# Patient Record
Sex: MALE | Race: WHITE | Employment: FULL TIME | URBAN - METROPOLITAN AREA
[De-identification: names, ages, dates, MRNs, and addresses within clinical notes are randomized per-mention and may not be internally consistent; named-entity substitution may affect disease eponyms.]

---

## 2017-01-23 ENCOUNTER — TELEPHONE (OUTPATIENT)
Dept: FAMILY MEDICINE CLINIC | Age: 57
End: 2017-01-23

## 2017-01-24 ENCOUNTER — NURSE ONLY (OUTPATIENT)
Dept: FAMILY MEDICINE CLINIC | Age: 57
End: 2017-01-24

## 2017-01-24 DIAGNOSIS — Z00.00 WELL ADULT EXAM: Primary | ICD-10-CM

## 2017-01-24 LAB
A/G RATIO: 1.7 (ref 1.1–2.2)
ALBUMIN SERPL-MCNC: 4.3 G/DL (ref 3.4–5)
ALP BLD-CCNC: 52 U/L (ref 40–129)
ALT SERPL-CCNC: 26 U/L (ref 10–40)
ANION GAP SERPL CALCULATED.3IONS-SCNC: 14 MMOL/L (ref 3–16)
AST SERPL-CCNC: 22 U/L (ref 15–37)
BILIRUB SERPL-MCNC: 1 MG/DL (ref 0–1)
BILIRUBIN, POC: ABNORMAL
BLOOD URINE, POC: ABNORMAL
BUN BLDV-MCNC: 9 MG/DL (ref 7–20)
CALCIUM SERPL-MCNC: 9.2 MG/DL (ref 8.3–10.6)
CHLORIDE BLD-SCNC: 101 MMOL/L (ref 99–110)
CHOLESTEROL, TOTAL: 160 MG/DL (ref 0–199)
CLARITY, POC: ABNORMAL
CO2: 26 MMOL/L (ref 21–32)
COLOR, POC: ABNORMAL
CREAT SERPL-MCNC: 0.9 MG/DL (ref 0.9–1.3)
GFR AFRICAN AMERICAN: >60
GFR NON-AFRICAN AMERICAN: >60
GLOBULIN: 2.6 G/DL
GLUCOSE BLD-MCNC: 113 MG/DL (ref 70–99)
GLUCOSE URINE, POC: ABNORMAL
HCT VFR BLD CALC: 45.8 % (ref 40.5–52.5)
HDLC SERPL-MCNC: 63 MG/DL (ref 40–60)
HEMOGLOBIN: 15.5 G/DL (ref 13.5–17.5)
KETONES, POC: ABNORMAL
LDL CHOLESTEROL CALCULATED: 68 MG/DL
LEUKOCYTE EST, POC: ABNORMAL
MCH RBC QN AUTO: 32.9 PG (ref 26–34)
MCHC RBC AUTO-ENTMCNC: 33.8 G/DL (ref 31–36)
MCV RBC AUTO: 97.2 FL (ref 80–100)
NITRITE, POC: ABNORMAL
PDW BLD-RTO: 13.6 % (ref 12.4–15.4)
PH, POC: 6
PLATELET # BLD: 224 K/UL (ref 135–450)
PMV BLD AUTO: 8.4 FL (ref 5–10.5)
POTASSIUM SERPL-SCNC: 3.9 MMOL/L (ref 3.5–5.1)
PROSTATE SPECIFIC ANTIGEN: 1.54 NG/ML (ref 0–4)
PROTEIN, POC: ABNORMAL
RBC # BLD: 4.71 M/UL (ref 4.2–5.9)
SODIUM BLD-SCNC: 141 MMOL/L (ref 136–145)
SPECIFIC GRAVITY, POC: 1.02
TOTAL PROTEIN: 6.9 G/DL (ref 6.4–8.2)
TRIGL SERPL-MCNC: 147 MG/DL (ref 0–150)
TSH SERPL DL<=0.05 MIU/L-ACNC: 4.13 UIU/ML (ref 0.27–4.2)
UROBILINOGEN, POC: 0.2
VLDLC SERPL CALC-MCNC: 29 MG/DL
WBC # BLD: 7.3 K/UL (ref 4–11)

## 2017-01-24 PROCEDURE — 81002 URINALYSIS NONAUTO W/O SCOPE: CPT | Performed by: FAMILY MEDICINE

## 2017-01-24 PROCEDURE — 36415 COLL VENOUS BLD VENIPUNCTURE: CPT | Performed by: FAMILY MEDICINE

## 2017-01-31 ENCOUNTER — OFFICE VISIT (OUTPATIENT)
Dept: FAMILY MEDICINE CLINIC | Age: 57
End: 2017-01-31

## 2017-01-31 VITALS
HEIGHT: 77 IN | SYSTOLIC BLOOD PRESSURE: 154 MMHG | DIASTOLIC BLOOD PRESSURE: 110 MMHG | BODY MASS INDEX: 25.76 KG/M2 | WEIGHT: 218.2 LBS

## 2017-01-31 DIAGNOSIS — R31.29 HEMATURIA, MICROSCOPIC: ICD-10-CM

## 2017-01-31 DIAGNOSIS — F41.9 ANXIETY: ICD-10-CM

## 2017-01-31 DIAGNOSIS — Z23 NEED FOR PNEUMOCOCCAL VACCINATION: ICD-10-CM

## 2017-01-31 DIAGNOSIS — M54.41 CHRONIC MIDLINE LOW BACK PAIN WITH BILATERAL SCIATICA: ICD-10-CM

## 2017-01-31 DIAGNOSIS — Z00.00 HEALTH CARE MAINTENANCE: Primary | ICD-10-CM

## 2017-01-31 DIAGNOSIS — J43.1 PANLOBULAR EMPHYSEMA (HCC): ICD-10-CM

## 2017-01-31 DIAGNOSIS — M54.42 CHRONIC MIDLINE LOW BACK PAIN WITH BILATERAL SCIATICA: ICD-10-CM

## 2017-01-31 DIAGNOSIS — G89.29 CHRONIC MIDLINE LOW BACK PAIN WITH BILATERAL SCIATICA: ICD-10-CM

## 2017-01-31 DIAGNOSIS — I10 ESSENTIAL HYPERTENSION: ICD-10-CM

## 2017-01-31 PROCEDURE — 90471 IMMUNIZATION ADMIN: CPT | Performed by: FAMILY MEDICINE

## 2017-01-31 PROCEDURE — 99396 PREV VISIT EST AGE 40-64: CPT | Performed by: FAMILY MEDICINE

## 2017-01-31 PROCEDURE — 90732 PPSV23 VACC 2 YRS+ SUBQ/IM: CPT | Performed by: FAMILY MEDICINE

## 2017-01-31 RX ORDER — ALPRAZOLAM 0.5 MG/1
TABLET ORAL
Qty: 90 TABLET | Refills: 0 | Status: SHIPPED | OUTPATIENT
Start: 2017-01-31 | End: 2017-02-27

## 2017-01-31 RX ORDER — HYDROCODONE BITARTRATE AND ACETAMINOPHEN 5; 325 MG/1; MG/1
1 TABLET ORAL EVERY 6 HOURS PRN
Qty: 50 TABLET | Refills: 0 | Status: SHIPPED | OUTPATIENT
Start: 2017-01-31 | End: 2018-02-09

## 2017-01-31 RX ORDER — BUDESONIDE AND FORMOTEROL FUMARATE DIHYDRATE 160; 4.5 UG/1; UG/1
2 AEROSOL RESPIRATORY (INHALATION) 2 TIMES DAILY
Qty: 1 INHALER | Refills: 5 | Status: SHIPPED | OUTPATIENT
Start: 2017-01-31 | End: 2018-02-09

## 2017-01-31 RX ORDER — LOSARTAN POTASSIUM 100 MG/1
100 TABLET ORAL DAILY
Qty: 30 TABLET | Refills: 3 | Status: SHIPPED | OUTPATIENT
Start: 2017-01-31 | End: 2017-06-02 | Stop reason: SDUPTHER

## 2017-01-31 RX ORDER — AMLODIPINE BESYLATE AND ATORVASTATIN CALCIUM 5; 20 MG/1; MG/1
TABLET, FILM COATED ORAL
Qty: 30 TABLET | Refills: 3 | Status: SHIPPED | OUTPATIENT
Start: 2017-01-31 | End: 2017-07-30 | Stop reason: SDUPTHER

## 2017-01-31 ASSESSMENT — ENCOUNTER SYMPTOMS
CONSTIPATION: 0
ABDOMINAL PAIN: 0
WHEEZING: 0
SORE THROAT: 0
BLOOD IN STOOL: 0
TROUBLE SWALLOWING: 0
CHEST TIGHTNESS: 0
SHORTNESS OF BREATH: 1
EYE PAIN: 0
DIARRHEA: 0

## 2017-02-05 LAB
6-ACETYLMORPHINE: NOT DETECTED
7-AMINOCLONAZEPAM: NOT DETECTED
ALPHA-OH-ALPRAZOLAM: NOT DETECTED
ALPRAZOLAM: NOT DETECTED
AMPHETAMINE: NOT DETECTED
BARBITURATES: NOT DETECTED
BENZOYLECGONINE: NOT DETECTED
BUPRENORPHINE: NOT DETECTED
CARISOPRODOL: NOT DETECTED
CLONAZEPAM: NOT DETECTED
CODEINE: NOT DETECTED
CREATININE URINE: 65 MG/DL (ref 20–400)
DIAZEPAM: NOT DETECTED
DRUGS EXPECTED: NORMAL
EER PAIN MGT DRUG PANEL, HIGH RES/EMIT U: NORMAL
ETHYL GLUCURONIDE: PRESENT
FENTANYL: NOT DETECTED
HYDROCODONE: PRESENT
HYDROMORPHONE: NOT DETECTED
LORAZEPAM: NOT DETECTED
MARIJUANA METABOLITE: PRESENT
MDA: NOT DETECTED
MDEA: NOT DETECTED
MDMA URINE: NOT DETECTED
MEPERIDINE: NOT DETECTED
METHADONE: NOT DETECTED
METHAMPHETAMINE: NOT DETECTED
METHYLPHENIDATE: NOT DETECTED
MIDAZOLAM: NOT DETECTED
MORPHINE: NOT DETECTED
NORBUPRENORPHINE, FREE: NOT DETECTED
NORDIAZEPAM: NOT DETECTED
NORFENTANYL: NOT DETECTED
NORHYDROCODONE, URINE: PRESENT
NOROXYCODONE: NOT DETECTED
NOROXYMORPHONE, URINE: NOT DETECTED
OXAZEPAM: NOT DETECTED
OXYCODONE: NOT DETECTED
OXYMORPHONE: NOT DETECTED
PAIN MANAGEMENT DRUG PANEL: NORMAL
PAIN MANAGEMENT DRUG PANEL: NORMAL
PCP: NOT DETECTED
PHENTERMINE: NOT DETECTED
PROPOXYPHENE: NOT DETECTED
TAPENTADOL, URINE: NOT DETECTED
TAPENTADOL-O-SULFATE, URINE: NOT DETECTED
TEMAZEPAM: NOT DETECTED
TRAMADOL: NOT DETECTED
ZOLPIDEM: NOT DETECTED

## 2017-02-27 ENCOUNTER — OFFICE VISIT (OUTPATIENT)
Dept: FAMILY MEDICINE CLINIC | Age: 57
End: 2017-02-27

## 2017-02-27 VITALS
HEIGHT: 77 IN | BODY MASS INDEX: 25.93 KG/M2 | SYSTOLIC BLOOD PRESSURE: 142 MMHG | WEIGHT: 219.6 LBS | DIASTOLIC BLOOD PRESSURE: 100 MMHG

## 2017-02-27 DIAGNOSIS — I10 ESSENTIAL HYPERTENSION: Primary | ICD-10-CM

## 2017-02-27 DIAGNOSIS — F41.9 ANXIETY: ICD-10-CM

## 2017-02-27 DIAGNOSIS — J43.1 PANLOBULAR EMPHYSEMA (HCC): ICD-10-CM

## 2017-02-27 PROCEDURE — 99213 OFFICE O/P EST LOW 20 MIN: CPT | Performed by: FAMILY MEDICINE

## 2017-02-27 RX ORDER — DIAZEPAM 2 MG/1
2 TABLET ORAL EVERY 12 HOURS PRN
Qty: 60 TABLET | Refills: 2 | Status: SHIPPED | OUTPATIENT
Start: 2017-02-27 | End: 2017-11-02 | Stop reason: SDUPTHER

## 2017-02-27 RX ORDER — METOPROLOL TARTRATE 50 MG/1
50 TABLET, FILM COATED ORAL 2 TIMES DAILY
Qty: 60 TABLET | Refills: 3 | Status: SHIPPED | OUTPATIENT
Start: 2017-02-27 | End: 2017-03-21 | Stop reason: SDUPTHER

## 2017-02-27 ASSESSMENT — ENCOUNTER SYMPTOMS
SHORTNESS OF BREATH: 0
BLOOD IN STOOL: 0
WHEEZING: 0
CONSTIPATION: 0
TROUBLE SWALLOWING: 0
CHEST TIGHTNESS: 0
ABDOMINAL PAIN: 0
DIARRHEA: 0
SORE THROAT: 0

## 2017-03-21 ENCOUNTER — OFFICE VISIT (OUTPATIENT)
Dept: FAMILY MEDICINE CLINIC | Age: 57
End: 2017-03-21

## 2017-03-21 VITALS
BODY MASS INDEX: 25.86 KG/M2 | DIASTOLIC BLOOD PRESSURE: 80 MMHG | SYSTOLIC BLOOD PRESSURE: 130 MMHG | WEIGHT: 219 LBS | HEIGHT: 77 IN

## 2017-03-21 DIAGNOSIS — I10 ESSENTIAL HYPERTENSION: Primary | ICD-10-CM

## 2017-03-21 PROCEDURE — 99213 OFFICE O/P EST LOW 20 MIN: CPT | Performed by: FAMILY MEDICINE

## 2017-03-21 RX ORDER — METOPROLOL TARTRATE 50 MG/1
50 TABLET, FILM COATED ORAL DAILY
Qty: 60 TABLET | Refills: 3
Start: 2017-03-21 | End: 2018-02-09

## 2017-03-21 ASSESSMENT — ENCOUNTER SYMPTOMS
DIARRHEA: 0
BLOOD IN STOOL: 0
SORE THROAT: 0
CONSTIPATION: 0
CHEST TIGHTNESS: 0
SHORTNESS OF BREATH: 0
WHEEZING: 0
ABDOMINAL PAIN: 0
TROUBLE SWALLOWING: 0

## 2017-03-21 ASSESSMENT — PATIENT HEALTH QUESTIONNAIRE - PHQ9
1. LITTLE INTEREST OR PLEASURE IN DOING THINGS: 1
SUM OF ALL RESPONSES TO PHQ QUESTIONS 1-9: 1
SUM OF ALL RESPONSES TO PHQ9 QUESTIONS 1 & 2: 1
2. FEELING DOWN, DEPRESSED OR HOPELESS: 0

## 2017-06-02 DIAGNOSIS — I10 ESSENTIAL HYPERTENSION: ICD-10-CM

## 2017-06-03 RX ORDER — LOSARTAN POTASSIUM 100 MG/1
TABLET ORAL
Qty: 30 TABLET | Refills: 0 | Status: SHIPPED | OUTPATIENT
Start: 2017-06-03 | End: 2017-07-03 | Stop reason: SDUPTHER

## 2017-07-31 RX ORDER — AMLODIPINE BESYLATE AND ATORVASTATIN CALCIUM 5; 20 MG/1; MG/1
TABLET, FILM COATED ORAL
Qty: 30 TABLET | Refills: 0 | Status: SHIPPED | OUTPATIENT
Start: 2017-07-31 | End: 2017-08-27 | Stop reason: SDUPTHER

## 2017-09-05 DIAGNOSIS — I10 ESSENTIAL HYPERTENSION: ICD-10-CM

## 2017-09-05 RX ORDER — LOSARTAN POTASSIUM 100 MG/1
TABLET ORAL
Qty: 30 TABLET | Refills: 0 | Status: SHIPPED | OUTPATIENT
Start: 2017-09-05 | End: 2017-10-02 | Stop reason: SDUPTHER

## 2017-10-02 DIAGNOSIS — I10 ESSENTIAL HYPERTENSION: ICD-10-CM

## 2017-10-02 RX ORDER — LOSARTAN POTASSIUM 100 MG/1
TABLET ORAL
Qty: 30 TABLET | Refills: 0 | Status: SHIPPED | OUTPATIENT
Start: 2017-10-02 | End: 2017-10-30 | Stop reason: SDUPTHER

## 2017-10-11 NOTE — PROGRESS NOTES
Subjective:      Patient ID: Bryan Ruano is a 61 y.o. male. HPI     Right Side Pain:  Patient began 1 week ago with pain in the right anterior lower ribs that radiates to the right lower posterior ribs. He has had similar symptoms a couple years ago and had normal GBUS and HIDA scan. It spontaneously resolved until 1 week ago when he helped his daughter move. He does not recall a specific injury. It is painful to take a deep breath. He does have cough and dyspnea. It is worse with movements but is not sore to the touch. Early Satiety:  Patient complains of a 6 month history of early satiety and feeling bloated. He denies any abdominal pain. Palpitations / Dizziness / Chest Pain:  Patient complains of a 1 year history of intermittent episodes of feeling lightheaded with presyncope with palpitations when he exerts himself. He will occasionally have left sided chest pain. He admits to being under a lot of stress. He had a negative stress test done 2/2016. Review of Systems   Constitutional: Negative for appetite change, chills and fever. Respiratory: Positive for cough and shortness of breath. Cardiovascular: Positive for chest pain and palpitations. Negative for leg swelling. Gastrointestinal: Positive for abdominal distention, diarrhea and nausea. Negative for abdominal pain, blood in stool, constipation and vomiting. BP Readings from Last 3 Encounters:   10/12/17 (!) 164/100   03/21/17 130/80   02/27/17 (!) 142/100       BP (!) 144/92   Ht 6' 5\" (1.956 m)   Wt 226 lb (102.5 kg)   BMI 26.80 kg/m²    BP (!) 164/100 (Site: Right Arm)   Ht 6' 5\" (1.956 m)   Wt 226 lb (102.5 kg)   BMI 26.80 kg/m²    Objective:   Physical Exam   Constitutional: He is oriented to person, place, and time. He appears well-developed and well-nourished. No distress. HENT:   Head: Normocephalic.    Right Ear: External ear normal.   Left Ear: External ear normal.   Mouth/Throat: Oropharynx is

## 2017-10-12 ENCOUNTER — HOSPITAL ENCOUNTER (OUTPATIENT)
Dept: CT IMAGING | Age: 57
Discharge: HOME | End: 2017-10-12
Attending: FAMILY MEDICINE | Admitting: FAMILY MEDICINE

## 2017-10-12 ENCOUNTER — OFFICE VISIT (OUTPATIENT)
Dept: FAMILY MEDICINE CLINIC | Age: 57
End: 2017-10-12

## 2017-10-12 VITALS
HEIGHT: 77 IN | DIASTOLIC BLOOD PRESSURE: 92 MMHG | WEIGHT: 226 LBS | SYSTOLIC BLOOD PRESSURE: 144 MMHG | BODY MASS INDEX: 26.68 KG/M2

## 2017-10-12 DIAGNOSIS — R00.2 PALPITATIONS: ICD-10-CM

## 2017-10-12 DIAGNOSIS — R07.81 RIB PAIN ON RIGHT SIDE: Primary | ICD-10-CM

## 2017-10-12 DIAGNOSIS — R79.89 OTHER SPECIFIED ABNORMAL FINDINGS OF BLOOD CHEMISTRY: ICD-10-CM

## 2017-10-12 DIAGNOSIS — R79.89 ELEVATED D-DIMER: ICD-10-CM

## 2017-10-12 DIAGNOSIS — R42 POSTURAL DIZZINESS WITH PRESYNCOPE: ICD-10-CM

## 2017-10-12 DIAGNOSIS — R06.02 SHORTNESS OF BREATH: ICD-10-CM

## 2017-10-12 DIAGNOSIS — R91.8 MASS OF UPPER LOBE OF LEFT LUNG: Primary | ICD-10-CM

## 2017-10-12 DIAGNOSIS — R68.81 EARLY SATIETY: ICD-10-CM

## 2017-10-12 DIAGNOSIS — R07.9 RIGHT-SIDED CHEST PAIN: Primary | ICD-10-CM

## 2017-10-12 DIAGNOSIS — R79.89 ELEVATED D-DIMER: Primary | ICD-10-CM

## 2017-10-12 DIAGNOSIS — Z23 NEED FOR INFLUENZA VACCINATION: ICD-10-CM

## 2017-10-12 DIAGNOSIS — R07.2 PRECORDIAL PAIN: ICD-10-CM

## 2017-10-12 DIAGNOSIS — R55 POSTURAL DIZZINESS WITH PRESYNCOPE: ICD-10-CM

## 2017-10-12 LAB
D DIMER: 645 NG/ML DDU (ref 0–229)
HCT VFR BLD CALC: 44.6 % (ref 40.5–52.5)
HEMOGLOBIN: 15.4 G/DL (ref 13.5–17.5)
MCH RBC QN AUTO: 34.4 PG (ref 26–34)
MCHC RBC AUTO-ENTMCNC: 34.6 G/DL (ref 31–36)
MCV RBC AUTO: 99.4 FL (ref 80–100)
PDW BLD-RTO: 13.5 % (ref 12.4–15.4)
PLATELET # BLD: 273 K/UL (ref 135–450)
PMV BLD AUTO: 7.9 FL (ref 5–10.5)
RBC # BLD: 4.49 M/UL (ref 4.2–5.9)
TSH SERPL DL<=0.05 MIU/L-ACNC: 4.04 UIU/ML (ref 0.27–4.2)
WBC # BLD: 8 K/UL (ref 4–11)

## 2017-10-12 PROCEDURE — 36415 COLL VENOUS BLD VENIPUNCTURE: CPT | Performed by: FAMILY MEDICINE

## 2017-10-12 PROCEDURE — 90471 IMMUNIZATION ADMIN: CPT | Performed by: FAMILY MEDICINE

## 2017-10-12 PROCEDURE — 90686 IIV4 VACC NO PRSV 0.5 ML IM: CPT | Performed by: FAMILY MEDICINE

## 2017-10-12 PROCEDURE — 99214 OFFICE O/P EST MOD 30 MIN: CPT | Performed by: FAMILY MEDICINE

## 2017-10-12 RX ORDER — OMEPRAZOLE 20 MG/1
20 CAPSULE, DELAYED RELEASE ORAL 2 TIMES DAILY
Qty: 30 CAPSULE | Refills: 3 | Status: SHIPPED | OUTPATIENT
Start: 2017-10-12 | End: 2018-02-09

## 2017-10-12 ASSESSMENT — ENCOUNTER SYMPTOMS
SHORTNESS OF BREATH: 1
VOMITING: 0
COUGH: 1
ABDOMINAL PAIN: 0
CONSTIPATION: 0
BLOOD IN STOOL: 0
NAUSEA: 1
DIARRHEA: 1
ABDOMINAL DISTENTION: 1

## 2017-10-13 ENCOUNTER — TELEPHONE (OUTPATIENT)
Dept: PULMONOLOGY | Age: 57
End: 2017-10-13

## 2017-10-13 ENCOUNTER — TELEPHONE (OUTPATIENT)
Dept: FAMILY MEDICINE CLINIC | Age: 57
End: 2017-10-13

## 2017-10-13 DIAGNOSIS — J43.1 PANLOBULAR EMPHYSEMA (HCC): Primary | ICD-10-CM

## 2017-10-13 DIAGNOSIS — R91.8 MASS OF LEFT LUNG: ICD-10-CM

## 2017-10-13 NOTE — TELEPHONE ENCOUNTER
Pt can not get in with Dr Fatou Mauro for a month. HIs daughter called to ask Dr Dayanna Puckett to send a new referral to Dr Jeannie Alvarez who can get him in asap [phone](R) [phone](X).  Please give pt a call when complete [phone]

## 2017-10-13 NOTE — TELEPHONE ENCOUNTER
Patient calling, stating he was referred by Dr. Erica Pinto for a lung nodule/Abnl CT Chest.  Please refiew and advise on urgency.

## 2017-10-30 DIAGNOSIS — I10 ESSENTIAL HYPERTENSION: ICD-10-CM

## 2017-10-30 RX ORDER — AMLODIPINE BESYLATE AND ATORVASTATIN CALCIUM 5; 20 MG/1; MG/1
TABLET, FILM COATED ORAL
Qty: 30 TABLET | Refills: 0 | Status: SHIPPED | OUTPATIENT
Start: 2017-10-30 | End: 2017-11-29 | Stop reason: SDUPTHER

## 2017-10-30 RX ORDER — LOSARTAN POTASSIUM 100 MG/1
TABLET ORAL
Qty: 30 TABLET | Refills: 0 | Status: SHIPPED | OUTPATIENT
Start: 2017-10-30 | End: 2017-11-29 | Stop reason: SDUPTHER

## 2017-11-02 DIAGNOSIS — F41.9 ANXIETY: ICD-10-CM

## 2017-11-02 RX ORDER — DIAZEPAM 2 MG/1
2 TABLET ORAL EVERY 12 HOURS PRN
Qty: 60 TABLET | Refills: 0 | Status: SHIPPED | OUTPATIENT
Start: 2017-11-02 | End: 2018-06-04

## 2018-01-02 RX ORDER — AMLODIPINE BESYLATE AND ATORVASTATIN CALCIUM 5; 20 MG/1; MG/1
TABLET, FILM COATED ORAL
Qty: 30 TABLET | Refills: 0 | Status: SHIPPED | OUTPATIENT
Start: 2018-01-02 | End: 2018-02-06 | Stop reason: SDUPTHER

## 2018-02-06 RX ORDER — AMLODIPINE BESYLATE AND ATORVASTATIN CALCIUM 5; 20 MG/1; MG/1
TABLET, FILM COATED ORAL
Qty: 30 TABLET | Refills: 0 | Status: SHIPPED | OUTPATIENT
Start: 2018-02-06 | End: 2018-03-07 | Stop reason: SDUPTHER

## 2018-02-09 ENCOUNTER — OFFICE VISIT (OUTPATIENT)
Dept: FAMILY MEDICINE CLINIC | Age: 58
End: 2018-02-09

## 2018-02-09 VITALS
WEIGHT: 209 LBS | SYSTOLIC BLOOD PRESSURE: 110 MMHG | BODY MASS INDEX: 24.68 KG/M2 | HEIGHT: 77 IN | DIASTOLIC BLOOD PRESSURE: 80 MMHG

## 2018-02-09 DIAGNOSIS — J43.1 PANLOBULAR EMPHYSEMA (HCC): ICD-10-CM

## 2018-02-09 DIAGNOSIS — Z29.9 PREVENTIVE MEASURE: ICD-10-CM

## 2018-02-09 DIAGNOSIS — K40.90 NON-RECURRENT UNILATERAL INGUINAL HERNIA WITHOUT OBSTRUCTION OR GANGRENE: ICD-10-CM

## 2018-02-09 DIAGNOSIS — E78.2 MIXED HYPERLIPIDEMIA: ICD-10-CM

## 2018-02-09 DIAGNOSIS — I10 ESSENTIAL HYPERTENSION: ICD-10-CM

## 2018-02-09 DIAGNOSIS — C34.92 CARCINOMA OF LEFT LUNG (HCC): ICD-10-CM

## 2018-02-09 DIAGNOSIS — Z00.00 HEALTHCARE MAINTENANCE: Primary | ICD-10-CM

## 2018-02-09 LAB
A/G RATIO: 1.7 (ref 1.1–2.2)
ALBUMIN SERPL-MCNC: 4.4 G/DL (ref 3.4–5)
ALP BLD-CCNC: 58 U/L (ref 40–129)
ALT SERPL-CCNC: 45 U/L (ref 10–40)
ANION GAP SERPL CALCULATED.3IONS-SCNC: 15 MMOL/L (ref 3–16)
AST SERPL-CCNC: 38 U/L (ref 15–37)
BILIRUB SERPL-MCNC: 0.6 MG/DL (ref 0–1)
BUN BLDV-MCNC: 8 MG/DL (ref 7–20)
CALCIUM SERPL-MCNC: 9.6 MG/DL (ref 8.3–10.6)
CHLORIDE BLD-SCNC: 102 MMOL/L (ref 99–110)
CHOLESTEROL, TOTAL: 156 MG/DL (ref 0–199)
CO2: 25 MMOL/L (ref 21–32)
CREAT SERPL-MCNC: 0.9 MG/DL (ref 0.9–1.3)
GFR AFRICAN AMERICAN: >60
GFR NON-AFRICAN AMERICAN: >60
GLOBULIN: 2.6 G/DL
GLUCOSE BLD-MCNC: 105 MG/DL (ref 70–99)
HCT VFR BLD CALC: 39.4 % (ref 40.5–52.5)
HDLC SERPL-MCNC: 44 MG/DL (ref 40–60)
HEMOGLOBIN: 13.5 G/DL (ref 13.5–17.5)
HEPATITIS C ANTIBODY INTERPRETATION: NORMAL
LDL CHOLESTEROL CALCULATED: 83 MG/DL
MCH RBC QN AUTO: 33.6 PG (ref 26–34)
MCHC RBC AUTO-ENTMCNC: 34.2 G/DL (ref 31–36)
MCV RBC AUTO: 98.4 FL (ref 80–100)
PDW BLD-RTO: 13 % (ref 12.4–15.4)
PLATELET # BLD: 248 K/UL (ref 135–450)
PMV BLD AUTO: 8.8 FL (ref 5–10.5)
POTASSIUM SERPL-SCNC: 4.6 MMOL/L (ref 3.5–5.1)
PROSTATE SPECIFIC ANTIGEN: 1.91 NG/ML (ref 0–4)
RBC # BLD: 4 M/UL (ref 4.2–5.9)
SODIUM BLD-SCNC: 142 MMOL/L (ref 136–145)
TOTAL PROTEIN: 7 G/DL (ref 6.4–8.2)
TRIGL SERPL-MCNC: 143 MG/DL (ref 0–150)
VLDLC SERPL CALC-MCNC: 29 MG/DL
WBC # BLD: 6.2 K/UL (ref 4–11)

## 2018-02-09 PROCEDURE — 99396 PREV VISIT EST AGE 40-64: CPT | Performed by: FAMILY MEDICINE

## 2018-02-09 PROCEDURE — 36415 COLL VENOUS BLD VENIPUNCTURE: CPT | Performed by: FAMILY MEDICINE

## 2018-02-09 ASSESSMENT — ENCOUNTER SYMPTOMS
WHEEZING: 0
CHEST TIGHTNESS: 0
BLOOD IN STOOL: 0
TROUBLE SWALLOWING: 0
CONSTIPATION: 0
SORE THROAT: 0
ABDOMINAL PAIN: 0
SHORTNESS OF BREATH: 1
DIARRHEA: 0

## 2018-02-09 ASSESSMENT — PATIENT HEALTH QUESTIONNAIRE - PHQ9
1. LITTLE INTEREST OR PLEASURE IN DOING THINGS: 0
SUM OF ALL RESPONSES TO PHQ9 QUESTIONS 1 & 2: 1
SUM OF ALL RESPONSES TO PHQ QUESTIONS 1-9: 1
2. FEELING DOWN, DEPRESSED OR HOPELESS: 1

## 2018-02-09 NOTE — PROGRESS NOTES
Mouth/Throat: Oropharynx is clear and moist.   Eyes: Conjunctivae are normal. No scleral icterus. Neck: Neck supple. No thyromegaly present. Cardiovascular: Normal rate, regular rhythm, normal heart sounds and intact distal pulses. No murmur heard. No ankle edema   Pulmonary/Chest: Effort normal and breath sounds normal. No respiratory distress. Abdominal: Soft. Bowel sounds are normal. There is no tenderness. Musculoskeletal: He exhibits no edema. Lymphadenopathy:     He has no cervical adenopathy. Neurological: He is alert and oriented to person, place, and time. Skin: Skin is warm. No rash noted. Chest wall scarring left sided   Psychiatric: He has a normal mood and affect.  Thought content normal.       Assessment:      Assessment/Plan:  Poly Moreau was seen today for annual exam.    Diagnoses and all orders for this visit:    Healthcare maintenance  -     Comprehensive Metabolic Panel  -     CBC    Essential hypertension  -     Comprehensive Metabolic Panel  -     CBC    Mixed hyperlipidemia  -     Comprehensive Metabolic Panel  -     CBC  -     Lipid Panel    Panlobular emphysema (HCC)  -     Comprehensive Metabolic Panel  -     CBC    Carcinoma of left lung (HCC)  -     Comprehensive Metabolic Panel  -     CBC    Non-recurrent unilateral inguinal hernia without obstruction or gangrene  -     Ritu Weir MD    Preventive measure  -     Psa screening  -     Hepatitis C Antibody            Plan:      Cont meds   ref dr Ellen Calle for hernia    Likely put off surg for 6 month or more    Never smoke   control etoh

## 2018-02-26 ENCOUNTER — INITIAL CONSULT (OUTPATIENT)
Dept: SURGERY | Age: 58
End: 2018-02-26

## 2018-02-26 VITALS
DIASTOLIC BLOOD PRESSURE: 80 MMHG | SYSTOLIC BLOOD PRESSURE: 118 MMHG | HEIGHT: 77 IN | BODY MASS INDEX: 24.68 KG/M2 | WEIGHT: 209 LBS

## 2018-02-26 DIAGNOSIS — K40.90 UNILATERAL INGUINAL HERNIA WITHOUT OBSTRUCTION OR GANGRENE, RECURRENCE NOT SPECIFIED: Primary | ICD-10-CM

## 2018-02-26 PROCEDURE — 99999 PR OFFICE/OUTPT VISIT,PROCEDURE ONLY: CPT | Performed by: SURGERY

## 2018-02-26 NOTE — PROGRESS NOTES
Subjective:      Patient ID: Александр Jiménez is a 61 y.o. male. HPI  Patient known from prior evaluation of inguinal hernia. Deferred repair at that time due to his wife's treatment of esophageal cancer. Unfortunately she has progressed and is due to stop treatment soon. He also underwent lobectomy for early lung cancer and has now recovered. His known left inguinal hernia has progressed and he would like to move ahead with repair but needs help his wife for now. Will await his call and plan accordingly. Review of Systems    Objective:   Physical Exam    Assessment:       1.  Unilateral inguinal hernia without obstruction or gangrene, recurrence not specified             Plan:      Repair at his convenience once other family matters are resolved

## 2018-03-07 DIAGNOSIS — I10 ESSENTIAL HYPERTENSION: ICD-10-CM

## 2018-03-07 RX ORDER — LOSARTAN POTASSIUM 100 MG/1
TABLET ORAL
Qty: 30 TABLET | Refills: 0 | Status: SHIPPED | OUTPATIENT
Start: 2018-03-07 | End: 2018-04-07 | Stop reason: SDUPTHER

## 2018-03-07 RX ORDER — AMLODIPINE BESYLATE AND ATORVASTATIN CALCIUM 5; 20 MG/1; MG/1
TABLET, FILM COATED ORAL
Qty: 30 TABLET | Refills: 0 | Status: SHIPPED | OUTPATIENT
Start: 2018-03-07 | End: 2018-04-07 | Stop reason: SDUPTHER

## 2018-05-17 ENCOUNTER — PAT TELEPHONE (OUTPATIENT)
Dept: PREADMISSION TESTING | Age: 58
End: 2018-05-17

## 2018-05-17 VITALS — WEIGHT: 220 LBS | HEIGHT: 77 IN | BODY MASS INDEX: 25.98 KG/M2

## 2018-05-17 RX ORDER — ALBUTEROL SULFATE 90 UG/1
AEROSOL, METERED RESPIRATORY (INHALATION)
COMMUNITY
Start: 2017-11-16 | End: 2018-06-04

## 2018-05-17 RX ORDER — GABAPENTIN 300 MG/1
300 CAPSULE ORAL DAILY
Refills: 0 | COMMUNITY
Start: 2018-03-29 | End: 2018-06-04

## 2018-05-18 ENCOUNTER — HOSPITAL ENCOUNTER (OUTPATIENT)
Dept: SURGERY | Age: 58
Discharge: HOME | End: 2018-05-18
Attending: SURGERY | Admitting: SURGERY

## 2018-05-18 VITALS
RESPIRATION RATE: 18 BRPM | BODY MASS INDEX: 25.08 KG/M2 | SYSTOLIC BLOOD PRESSURE: 137 MMHG | DIASTOLIC BLOOD PRESSURE: 83 MMHG | HEART RATE: 59 BPM | WEIGHT: 211.53 LBS | OXYGEN SATURATION: 96 % | TEMPERATURE: 97.1 F

## 2018-05-18 DIAGNOSIS — K40.90 LEFT INGUINAL HERNIA: Primary | ICD-10-CM

## 2018-05-18 PROCEDURE — 49505 PRP I/HERN INIT REDUC >5 YR: CPT | Performed by: SURGERY

## 2018-05-18 RX ORDER — OXYCODONE HYDROCHLORIDE AND ACETAMINOPHEN 5; 325 MG/1; MG/1
2 TABLET ORAL PRN
Status: COMPLETED | OUTPATIENT
Start: 2018-05-18 | End: 2018-05-18

## 2018-05-18 RX ORDER — MEPERIDINE HYDROCHLORIDE 25 MG/ML
12.5 INJECTION INTRAMUSCULAR; INTRAVENOUS; SUBCUTANEOUS EVERY 5 MIN PRN
Status: DISCONTINUED | OUTPATIENT
Start: 2018-05-18 | End: 2018-05-19 | Stop reason: HOSPADM

## 2018-05-18 RX ORDER — MORPHINE SULFATE 2 MG/ML
1 INJECTION, SOLUTION INTRAMUSCULAR; INTRAVENOUS EVERY 5 MIN PRN
Status: DISCONTINUED | OUTPATIENT
Start: 2018-05-18 | End: 2018-05-19 | Stop reason: HOSPADM

## 2018-05-18 RX ORDER — SODIUM CHLORIDE 0.9 % (FLUSH) 0.9 %
10 SYRINGE (ML) INJECTION PRN
Status: DISCONTINUED | OUTPATIENT
Start: 2018-05-18 | End: 2018-05-19 | Stop reason: HOSPADM

## 2018-05-18 RX ORDER — FENTANYL CITRATE 50 UG/ML
25 INJECTION, SOLUTION INTRAMUSCULAR; INTRAVENOUS EVERY 5 MIN PRN
Status: DISCONTINUED | OUTPATIENT
Start: 2018-05-18 | End: 2018-05-19 | Stop reason: HOSPADM

## 2018-05-18 RX ORDER — ONDANSETRON 2 MG/ML
4 INJECTION INTRAMUSCULAR; INTRAVENOUS
Status: ACTIVE | OUTPATIENT
Start: 2018-05-18 | End: 2018-05-18

## 2018-05-18 RX ORDER — OXYCODONE HYDROCHLORIDE AND ACETAMINOPHEN 5; 325 MG/1; MG/1
1 TABLET ORAL PRN
Status: COMPLETED | OUTPATIENT
Start: 2018-05-18 | End: 2018-05-18

## 2018-05-18 RX ORDER — SODIUM CHLORIDE 0.9 % (FLUSH) 0.9 %
10 SYRINGE (ML) INJECTION EVERY 12 HOURS SCHEDULED
Status: DISCONTINUED | OUTPATIENT
Start: 2018-05-18 | End: 2018-05-19 | Stop reason: HOSPADM

## 2018-05-18 RX ORDER — OXYCODONE HYDROCHLORIDE 5 MG/1
5 TABLET ORAL EVERY 6 HOURS PRN
Qty: 28 TABLET | Refills: 0 | Status: SHIPPED | OUTPATIENT
Start: 2018-05-18 | End: 2018-05-25

## 2018-05-18 RX ORDER — NAPROXEN 500 MG/1
500 TABLET ORAL 2 TIMES DAILY WITH MEALS
Qty: 28 TABLET | Refills: 0 | Status: SHIPPED | OUTPATIENT
Start: 2018-05-18 | End: 2018-10-24 | Stop reason: ALTCHOICE

## 2018-05-18 RX ORDER — MORPHINE SULFATE 2 MG/ML
2 INJECTION, SOLUTION INTRAMUSCULAR; INTRAVENOUS EVERY 5 MIN PRN
Status: DISCONTINUED | OUTPATIENT
Start: 2018-05-18 | End: 2018-05-19 | Stop reason: HOSPADM

## 2018-05-18 RX ORDER — SODIUM CHLORIDE 9 MG/ML
INJECTION, SOLUTION INTRAVENOUS CONTINUOUS
Status: DISCONTINUED | OUTPATIENT
Start: 2018-05-18 | End: 2018-05-19 | Stop reason: HOSPADM

## 2018-05-18 RX ORDER — FENTANYL CITRATE 50 UG/ML
50 INJECTION, SOLUTION INTRAMUSCULAR; INTRAVENOUS EVERY 5 MIN PRN
Status: DISCONTINUED | OUTPATIENT
Start: 2018-05-18 | End: 2018-05-19 | Stop reason: HOSPADM

## 2018-05-18 RX ADMIN — SODIUM CHLORIDE: 9 INJECTION, SOLUTION INTRAVENOUS at 06:24

## 2018-05-18 RX ADMIN — OXYCODONE HYDROCHLORIDE AND ACETAMINOPHEN 1 TABLET: 5; 325 TABLET ORAL at 09:25

## 2018-05-18 ASSESSMENT — PAIN DESCRIPTION - FREQUENCY: FREQUENCY: INTERMITTENT

## 2018-05-18 ASSESSMENT — PAIN - FUNCTIONAL ASSESSMENT: PAIN_FUNCTIONAL_ASSESSMENT: 0-10

## 2018-05-18 ASSESSMENT — PAIN SCALES - GENERAL
PAINLEVEL_OUTOF10: 0
PAINLEVEL_OUTOF10: 2
PAINLEVEL_OUTOF10: 4
PAINLEVEL_OUTOF10: 0
PAINLEVEL_OUTOF10: 4

## 2018-05-18 ASSESSMENT — PAIN DESCRIPTION - PAIN TYPE
TYPE: ACUTE PAIN;SURGICAL PAIN
TYPE: SURGICAL PAIN
TYPE: ACUTE PAIN;SURGICAL PAIN

## 2018-05-18 ASSESSMENT — PAIN DESCRIPTION - DESCRIPTORS: DESCRIPTORS: ACHING;BURNING

## 2018-05-18 ASSESSMENT — LIFESTYLE VARIABLES: SMOKING_STATUS: 0

## 2018-05-18 ASSESSMENT — PAIN DESCRIPTION - LOCATION
LOCATION: ABDOMEN
LOCATION: GROIN

## 2018-05-18 ASSESSMENT — PAIN DESCRIPTION - ORIENTATION: ORIENTATION: LEFT

## 2018-05-18 ASSESSMENT — ENCOUNTER SYMPTOMS: SHORTNESS OF BREATH: 0

## 2018-06-04 ENCOUNTER — OFFICE VISIT (OUTPATIENT)
Dept: FAMILY MEDICINE CLINIC | Age: 58
End: 2018-06-04

## 2018-06-04 VITALS
HEIGHT: 77 IN | SYSTOLIC BLOOD PRESSURE: 134 MMHG | DIASTOLIC BLOOD PRESSURE: 84 MMHG | WEIGHT: 213 LBS | BODY MASS INDEX: 25.15 KG/M2

## 2018-06-04 DIAGNOSIS — M54.42 CHRONIC BILATERAL LOW BACK PAIN WITH BILATERAL SCIATICA: ICD-10-CM

## 2018-06-04 DIAGNOSIS — M54.41 CHRONIC BILATERAL LOW BACK PAIN WITH BILATERAL SCIATICA: ICD-10-CM

## 2018-06-04 DIAGNOSIS — G89.29 CHRONIC BILATERAL LOW BACK PAIN WITH BILATERAL SCIATICA: ICD-10-CM

## 2018-06-04 DIAGNOSIS — I10 ESSENTIAL HYPERTENSION: Primary | ICD-10-CM

## 2018-06-04 DIAGNOSIS — E78.2 MIXED HYPERLIPIDEMIA: ICD-10-CM

## 2018-06-04 PROCEDURE — 99214 OFFICE O/P EST MOD 30 MIN: CPT | Performed by: FAMILY MEDICINE

## 2018-06-04 PROCEDURE — 36415 COLL VENOUS BLD VENIPUNCTURE: CPT | Performed by: FAMILY MEDICINE

## 2018-06-04 RX ORDER — METHYLPREDNISOLONE 4 MG/1
TABLET ORAL
Qty: 1 KIT | Refills: 0 | Status: SHIPPED | OUTPATIENT
Start: 2018-06-04 | End: 2018-06-10

## 2018-06-05 LAB
A/G RATIO: 1.7 (ref 1.1–2.2)
ALBUMIN SERPL-MCNC: 4.6 G/DL (ref 3.4–5)
ALP BLD-CCNC: 53 U/L (ref 40–129)
ALT SERPL-CCNC: 17 U/L (ref 10–40)
ANION GAP SERPL CALCULATED.3IONS-SCNC: 19 MMOL/L (ref 3–16)
AST SERPL-CCNC: 22 U/L (ref 15–37)
BILIRUB SERPL-MCNC: 0.9 MG/DL (ref 0–1)
BUN BLDV-MCNC: 8 MG/DL (ref 7–20)
CALCIUM SERPL-MCNC: 9.2 MG/DL (ref 8.3–10.6)
CHLORIDE BLD-SCNC: 97 MMOL/L (ref 99–110)
CHOLESTEROL, TOTAL: 148 MG/DL (ref 0–199)
CO2: 24 MMOL/L (ref 21–32)
CREAT SERPL-MCNC: 1 MG/DL (ref 0.9–1.3)
GFR AFRICAN AMERICAN: >60
GFR NON-AFRICAN AMERICAN: >60
GLOBULIN: 2.7 G/DL
GLUCOSE BLD-MCNC: 95 MG/DL (ref 70–99)
HDLC SERPL-MCNC: 53 MG/DL (ref 40–60)
LDL CHOLESTEROL CALCULATED: 45 MG/DL
POTASSIUM SERPL-SCNC: 3.8 MMOL/L (ref 3.5–5.1)
SODIUM BLD-SCNC: 140 MMOL/L (ref 136–145)
TOTAL PROTEIN: 7.3 G/DL (ref 6.4–8.2)
TRIGL SERPL-MCNC: 252 MG/DL (ref 0–150)
VLDLC SERPL CALC-MCNC: 50 MG/DL

## 2018-06-05 ASSESSMENT — ENCOUNTER SYMPTOMS
COUGH: 0
EYE PAIN: 0
SHORTNESS OF BREATH: 0
WHEEZING: 0
ABDOMINAL PAIN: 0

## 2018-06-05 ASSESSMENT — PATIENT HEALTH QUESTIONNAIRE - PHQ9
1. LITTLE INTEREST OR PLEASURE IN DOING THINGS: 0
2. FEELING DOWN, DEPRESSED OR HOPELESS: 0
SUM OF ALL RESPONSES TO PHQ QUESTIONS 1-9: 0
SUM OF ALL RESPONSES TO PHQ9 QUESTIONS 1 & 2: 0

## 2018-06-06 LAB
SEX HORMONE BINDING GLOBULIN: 33 NMOL/L (ref 11–80)
TESTOSTERONE FREE-NONMALE: 89.5 PG/ML (ref 47–244)
TESTOSTERONE TOTAL: 435 NG/DL (ref 220–1000)

## 2018-06-07 ENCOUNTER — OFFICE VISIT (OUTPATIENT)
Dept: SURGERY | Age: 58
End: 2018-06-07

## 2018-06-07 DIAGNOSIS — K40.90 LEFT INGUINAL HERNIA: Primary | ICD-10-CM

## 2018-06-07 PROCEDURE — 99024 POSTOP FOLLOW-UP VISIT: CPT | Performed by: SURGERY

## 2018-07-15 DIAGNOSIS — I10 ESSENTIAL HYPERTENSION: ICD-10-CM

## 2018-07-16 RX ORDER — LOSARTAN POTASSIUM 100 MG/1
TABLET ORAL
Qty: 30 TABLET | Refills: 0 | Status: SHIPPED | OUTPATIENT
Start: 2018-07-16 | End: 2018-08-16 | Stop reason: SDUPTHER

## 2018-09-21 ENCOUNTER — OFFICE VISIT (OUTPATIENT)
Dept: FAMILY MEDICINE CLINIC | Age: 58
End: 2018-09-21

## 2018-09-21 VITALS
SYSTOLIC BLOOD PRESSURE: 160 MMHG | BODY MASS INDEX: 25.46 KG/M2 | HEIGHT: 77 IN | WEIGHT: 215.6 LBS | DIASTOLIC BLOOD PRESSURE: 100 MMHG

## 2018-09-21 DIAGNOSIS — I10 ESSENTIAL HYPERTENSION: Primary | ICD-10-CM

## 2018-09-21 DIAGNOSIS — Z23 NEEDS FLU SHOT: ICD-10-CM

## 2018-09-21 DIAGNOSIS — F41.9 ANXIETY: ICD-10-CM

## 2018-09-21 PROCEDURE — 99213 OFFICE O/P EST LOW 20 MIN: CPT | Performed by: FAMILY MEDICINE

## 2018-09-21 PROCEDURE — 90682 RIV4 VACC RECOMBINANT DNA IM: CPT | Performed by: FAMILY MEDICINE

## 2018-09-21 PROCEDURE — 90471 IMMUNIZATION ADMIN: CPT | Performed by: FAMILY MEDICINE

## 2018-09-21 RX ORDER — NEBIVOLOL 10 MG/1
10 TABLET ORAL DAILY
Qty: 30 TABLET | Refills: 2 | Status: SHIPPED | OUTPATIENT
Start: 2018-09-21 | End: 2018-10-12 | Stop reason: SDUPTHER

## 2018-09-21 RX ORDER — DIAZEPAM 2 MG/1
2 TABLET ORAL NIGHTLY PRN
Qty: 30 TABLET | Refills: 1 | Status: SHIPPED | OUTPATIENT
Start: 2018-09-21 | End: 2018-10-21

## 2018-09-21 ASSESSMENT — ENCOUNTER SYMPTOMS
EYE PAIN: 0
COUGH: 0
WHEEZING: 0
SHORTNESS OF BREATH: 0
ABDOMINAL PAIN: 0

## 2018-09-21 ASSESSMENT — PATIENT HEALTH QUESTIONNAIRE - PHQ9
SUM OF ALL RESPONSES TO PHQ QUESTIONS 1-9: 2
SUM OF ALL RESPONSES TO PHQ QUESTIONS 1-9: 2
1. LITTLE INTEREST OR PLEASURE IN DOING THINGS: 1
2. FEELING DOWN, DEPRESSED OR HOPELESS: 1
SUM OF ALL RESPONSES TO PHQ9 QUESTIONS 1 & 2: 2

## 2018-09-21 NOTE — PROGRESS NOTES
Vaccine Information Sheet, \"Influenza - Inactivated\"  given to Dulce Carpenter, or parent/legal guardian of  Dulce Carpenter and verbalized understanding. Patient responses:    Have you ever had a reaction to a flu vaccine? No  Are you able to eat eggs without adverse effects? Yes  Do you have any current illness? No  Have you ever had Guillian Mercer Syndrome? No    Flu vaccine given per order. Please see immunization tab.
normal. There is no tenderness. Musculoskeletal: He exhibits no edema. Lymphadenopathy:     He has no cervical adenopathy. Neurological: He is alert and oriented to person, place, and time. Skin: Skin is warm. No rash noted. Psychiatric: He has a normal mood and affect. Thought content normal.       Assessment:      Assessment/Plan:  Nav Woodall was seen today for hypertension. Diagnoses and all orders for this visit:    Essential hypertension  -     nebivolol (BYSTOLIC) 10 MG tablet; Take 1 tablet by mouth daily    Anxiety  -     diazepam (VALIUM) 2 MG tablet; Take 1 tablet by mouth nightly as needed for Anxiety for up to 30 days. .    Needs flu shot  -     INFLUENZA, QUADV, RECOMBINANT, 18 YRS AND OLDER, IM, PF, PREFILL SYR OR SDV, 0.5ML (FLUBLOK QUADV, PF)                Plan:       Add bystolic for better bp control  rto 2 weeks          [place], DO

## 2018-10-05 ENCOUNTER — OFFICE VISIT (OUTPATIENT)
Dept: FAMILY MEDICINE CLINIC | Age: 58
End: 2018-10-05
Payer: COMMERCIAL

## 2018-10-05 VITALS
WEIGHT: 217 LBS | DIASTOLIC BLOOD PRESSURE: 100 MMHG | HEIGHT: 77 IN | SYSTOLIC BLOOD PRESSURE: 180 MMHG | BODY MASS INDEX: 25.62 KG/M2

## 2018-10-05 DIAGNOSIS — I10 ESSENTIAL HYPERTENSION: Primary | ICD-10-CM

## 2018-10-05 PROCEDURE — 99213 OFFICE O/P EST LOW 20 MIN: CPT | Performed by: FAMILY MEDICINE

## 2018-10-05 RX ORDER — HYDRALAZINE HYDROCHLORIDE 25 MG/1
25 TABLET, FILM COATED ORAL 3 TIMES DAILY
Qty: 90 TABLET | Refills: 3 | Status: SHIPPED | OUTPATIENT
Start: 2018-10-05 | End: 2018-10-12 | Stop reason: SDUPTHER

## 2018-10-05 ASSESSMENT — ENCOUNTER SYMPTOMS
EYE PAIN: 0
COUGH: 0
ABDOMINAL PAIN: 0
WHEEZING: 0
SHORTNESS OF BREATH: 0

## 2018-10-12 DIAGNOSIS — I10 ESSENTIAL HYPERTENSION: ICD-10-CM

## 2018-10-12 RX ORDER — HYDRALAZINE HYDROCHLORIDE 25 MG/1
25 TABLET, FILM COATED ORAL 3 TIMES DAILY
Qty: 90 TABLET | Refills: 0 | Status: SHIPPED | OUTPATIENT
Start: 2018-10-12 | End: 2018-10-15 | Stop reason: DRUGHIGH

## 2018-10-12 RX ORDER — LOSARTAN POTASSIUM 100 MG/1
TABLET ORAL
Qty: 30 TABLET | Refills: 0 | Status: SHIPPED | OUTPATIENT
Start: 2018-10-12 | End: 2018-12-19 | Stop reason: ALTCHOICE

## 2018-10-12 RX ORDER — NEBIVOLOL 10 MG/1
10 TABLET ORAL DAILY
Qty: 30 TABLET | Refills: 0 | Status: SHIPPED | OUTPATIENT
Start: 2018-10-12 | End: 2018-10-15 | Stop reason: DRUGHIGH

## 2018-10-15 ENCOUNTER — OFFICE VISIT (OUTPATIENT)
Dept: FAMILY MEDICINE CLINIC | Age: 58
End: 2018-10-15
Payer: COMMERCIAL

## 2018-10-15 VITALS
SYSTOLIC BLOOD PRESSURE: 156 MMHG | DIASTOLIC BLOOD PRESSURE: 96 MMHG | WEIGHT: 215.8 LBS | BODY MASS INDEX: 25.48 KG/M2 | HEIGHT: 77 IN

## 2018-10-15 DIAGNOSIS — I10 RESISTANT HYPERTENSION: Primary | ICD-10-CM

## 2018-10-15 PROCEDURE — 99213 OFFICE O/P EST LOW 20 MIN: CPT | Performed by: FAMILY MEDICINE

## 2018-10-15 RX ORDER — HYDRALAZINE HYDROCHLORIDE 50 MG/1
50 TABLET, FILM COATED ORAL 3 TIMES DAILY
Qty: 90 TABLET | Refills: 3 | Status: SHIPPED | OUTPATIENT
Start: 2018-10-15 | End: 2018-12-19 | Stop reason: SDUPTHER

## 2018-10-15 RX ORDER — METOPROLOL TARTRATE 50 MG/1
50 TABLET, FILM COATED ORAL 2 TIMES DAILY
Qty: 60 TABLET | Refills: 3 | Status: SHIPPED | OUTPATIENT
Start: 2018-10-15 | End: 2019-06-03 | Stop reason: SDUPTHER

## 2018-10-15 ASSESSMENT — ENCOUNTER SYMPTOMS
WHEEZING: 0
SHORTNESS OF BREATH: 0
COUGH: 0
ABDOMINAL PAIN: 0
EYE PAIN: 0

## 2018-10-15 NOTE — PROGRESS NOTES
Subjective:      Patient ID: Crys Garcia is a 60 y.o. male. HPI  Follow up blood pressure   been hard to get controlled    Cont with hedache    No neuro deficits    No exertional chest pain  Former smoker    Current Outpatient Prescriptions   Medication Sig      hydrALAZINE (APRESOLINE) 25 MG tablet Take 1 tablet by mouth 3 times daily      nebivolol (BYSTOLIC) 10 MG tablet Take 1 tablet by mouth daily      losartan (COZAAR) 100 MG tablet TAKE 1 TABLET BY MOUTH EVERY DAY      diazepam (VALIUM) 2 MG tablet Take 1 tablet by mouth nightly as needed for Anxiety for up to 30 days. Uzair Gails naproxen (NAPROSYN) 500 MG tablet Take 1 tablet by mouth 2 times daily (with meals) Please take twice a day for five days then as needed for ongoing pain       No current facility-administered medications for this visit. Review of Systems   Constitutional: Negative for appetite change, fatigue and unexpected weight change. Eyes: Negative for pain and visual disturbance. Respiratory: Negative for cough, shortness of breath and wheezing. Cardiovascular: Negative for chest pain, palpitations and leg swelling. Gastrointestinal: Negative for abdominal pain. Endocrine: Negative for polyuria. Genitourinary: Negative for difficulty urinating. Neurological: Positive for headaches. Negative for speech difficulty, weakness and numbness. Psychiatric/Behavioral: Negative for confusion and sleep disturbance. A complete 14 point  review of systems was completed; pertinent positives are noted in HPI    Vitals:    10/15/18 0915   BP: (!) 160/100   Weight: 215 lb 12.8 oz (97.9 kg)   Height: 6' 5\" (1.956 m)     Body mass index is 25.59 kg/m².      Wt Readings from Last 3 Encounters:   10/15/18 215 lb 12.8 oz (97.9 kg)   10/05/18 217 lb (98.4 kg)   09/21/18 215 lb 9.6 oz (97.8 kg)     BP Readings from Last 3 Encounters:   10/15/18 (!) 160/100   10/05/18 (!) 180/100   09/21/18 (!) 160/100        BP (!) 160/100   Ht 6' 5\" (1.956 m)   Wt 215 lb 12.8 oz (97.9 kg)   BMI 25.59 kg/m²    BP (!) 156/96   Ht 6' 5\" (1.956 m)   Wt 215 lb 12.8 oz (97.9 kg)   BMI 25.59 kg/m²     Objective:   Physical Exam   Constitutional: He is oriented to person, place, and time. He appears well-developed. No distress. HENT:   Right Ear: External ear normal.   Left Ear: External ear normal.   Nose: Nose normal.   Mouth/Throat: Oropharynx is clear and moist.   Eyes: Conjunctivae are normal. No scleral icterus. Neck: Neck supple. No thyromegaly present. Cardiovascular: Normal rate, regular rhythm, normal heart sounds and intact distal pulses. No murmur heard. Pulmonary/Chest: Effort normal and breath sounds normal. No respiratory distress. Abdominal: Soft. Bowel sounds are normal. There is no tenderness. Musculoskeletal: He exhibits no edema. Lymphadenopathy:     He has no cervical adenopathy. Neurological: He is alert and oriented to person, place, and time. Skin: Skin is warm. No rash noted. Psychiatric: He has a normal mood and affect. Thought content normal.       Assessment:      Assessment/Plan:  Ct Marcos was seen today for follow-up and hypertension. Diagnoses and all orders for this visit:    Resistant hypertension  -     metoprolol tartrate (LOPRESSOR) 50 MG tablet; Take 1 tablet by mouth 2 times daily  -     hydrALAZINE (APRESOLINE) 50 MG tablet;  Take 1 tablet by mouth 3 times daily  -     Ambulatory referral to Cardiology                Plan:       Change beta blocker to lopressor    inc dose hydralazine   cont marion  Ref to cardiology      [facility] heart    rto 1 week  To er if chest pain or neuro changes          [facility], DO

## 2018-10-24 ENCOUNTER — OFFICE VISIT (OUTPATIENT)
Dept: CARDIOLOGY CLINIC | Age: 58
End: 2018-10-24
Payer: COMMERCIAL

## 2018-10-24 ENCOUNTER — OFFICE VISIT (OUTPATIENT)
Dept: FAMILY MEDICINE CLINIC | Age: 58
End: 2018-10-24
Payer: COMMERCIAL

## 2018-10-24 VITALS
DIASTOLIC BLOOD PRESSURE: 100 MMHG | OXYGEN SATURATION: 93 % | SYSTOLIC BLOOD PRESSURE: 180 MMHG | BODY MASS INDEX: 25.74 KG/M2 | HEART RATE: 63 BPM | HEIGHT: 77 IN | WEIGHT: 218 LBS

## 2018-10-24 VITALS — SYSTOLIC BLOOD PRESSURE: 166 MMHG | BODY MASS INDEX: 25.73 KG/M2 | DIASTOLIC BLOOD PRESSURE: 96 MMHG | WEIGHT: 217 LBS

## 2018-10-24 DIAGNOSIS — I10 RESISTANT HYPERTENSION: Primary | ICD-10-CM

## 2018-10-24 DIAGNOSIS — I10 ESSENTIAL HYPERTENSION: Primary | ICD-10-CM

## 2018-10-24 DIAGNOSIS — E78.00 HYPERCHOLESTEREMIA: ICD-10-CM

## 2018-10-24 PROCEDURE — 99214 OFFICE O/P EST MOD 30 MIN: CPT | Performed by: INTERNAL MEDICINE

## 2018-10-24 PROCEDURE — 99213 OFFICE O/P EST LOW 20 MIN: CPT | Performed by: FAMILY MEDICINE

## 2018-10-24 RX ORDER — VALSARTAN 320 MG/1
320 TABLET ORAL DAILY
Qty: 30 TABLET | Refills: 11 | Status: SHIPPED | OUTPATIENT
Start: 2018-10-24 | End: 2019-03-06

## 2018-10-24 RX ORDER — NEBIVOLOL 10 MG/1
10 TABLET ORAL DAILY
COMMUNITY
End: 2018-12-19 | Stop reason: ALTCHOICE

## 2018-10-24 ASSESSMENT — ENCOUNTER SYMPTOMS
COUGH: 0
ABDOMINAL PAIN: 0
SHORTNESS OF BREATH: 0
WHEEZING: 0
EYE PAIN: 0

## 2018-10-24 NOTE — PROGRESS NOTES
[address]       H+P // CONSULT // OUTPATIENT VISIT // Isiah Saldivar     Referring Doctor Ivy Oden DO   Encounter Type Acute     CHIEF COMPLAINT     VisitType [x] Acute [] Chronic     Sxs [x] None [] CP [] SOB [] Dizzy [] Palps [] Fatigue     Problems HTN, CHOL      HISTORY OF PRESENT ILLNESS     New patient for HTN. Reports high blood pressure for months. Denies cp, sob. +headaches. Symptom Y N Frequency Duration Severity Modify Assoc Sx Other   CP [] [x]         SOB [] [x]         Dizzy [] [x]         Syncope [] [x]         Palpitations [] [x]           COMPLIANCE     Category Meds Diet Salt Exercise Tobacco Alcohol Drugs   Compliant [x] [x] [x] [x] [x] [x] [x]   [x]Counseling given on all above above categories    HISTORY/ALLERGIES/ROS     MedHx:  has a past medical history of Adenocarcinoma, lung ([facility] [address] Anxiety; COPD (chronic obstructive pulmonary disease) ([facility] [address] Hyperlipidemia; Hypertension; and Wears dentures. SurgHx:  has a past surgical history that includes Colonoscopy (2010); Upper gastrointestinal endoscopy (2010); Lung cancer surgery (2017); Tonsillectomy; and Inguinal hernia repair (Left, 05/18/2018). SocHx:   reports that he has quit smoking. His smoking use included Cigarettes. He smoked 0.25 packs per day. He has never used smokeless tobacco. He reports that he drinks alcohol. He reports that he does not use drugs. FamHx: family history is not on file. Allergies: Patient has no known allergies.    ROS:  [x]Full ROS obtained and negative except as mentioned in HPI     MEDICATIONS      Current Outpatient Prescriptions   Medication Sig Dispense Refill    metoprolol tartrate (LOPRESSOR) 50 MG tablet Take 1 tablet by mouth 2 times daily 60 tablet 3    hydrALAZINE (APRESOLINE) 50 MG tablet Take 1 tablet by mouth 3 times daily 90 tablet 3    losartan (COZAAR) 100 MG tablet TAKE 1 TABLET BY MOUTH EVERY DAY 30 tablet 0    naproxen (NAPROSYN) 500 MG tablet Take 1 tablet

## 2018-10-24 NOTE — LETTER
[address] [address] [address] [address]  Phone: [phone]  Fax: [phone]    Anika Mitchell MD        October 24, 2018     [address]a [address], [place] [id number]    Patient: Crys Garcia  MR Number: [id number]  YOB: 1960  Date of Visit: 10/24/2018    Dear Dr. Freda Dover:    Thank you for the request for consultation for Everardo Card to me for the evaluation of HTN. Below are the relevant portions of my assessment and plan of care. [address]       H+P // CONSULT // OUTPATIENT VISIT // Tasha Seaman     Referring Doctor Freda Dover DO   Encounter Type Acute     CHIEF COMPLAINT     VisitType [x] Acute [] Chronic     Sxs [x] None [] CP [] SOB [] Dizzy [] Palps [] Fatigue     Problems HTN, CHOL      HISTORY OF PRESENT ILLNESS     New patient for HTN. Reports high blood pressure for months. Denies cp, sob. +headaches. Symptom Y N Frequency Duration Severity Modify Assoc Sx Other   CP [] [x]         SOB [] [x]         Dizzy [] [x]         Syncope [] [x]         Palpitations [] [x]           COMPLIANCE     Category Meds Diet Salt Exercise Tobacco Alcohol Drugs   Compliant [x] [x] [x] [x] [x] [x] [x]   [x]Counseling given on all above above categories    HISTORY/ALLERGIES/ROS     MedHx:  has a past medical history of Adenocarcinoma, lung ([facility] [address] Anxiety; COPD (chronic obstructive pulmonary disease) ([facility] [address] Hyperlipidemia; Hypertension; and Wears dentures. SurgHx:  has a past surgical history that includes Colonoscopy (2010); Upper gastrointestinal endoscopy (2010); Lung cancer surgery (2017); Tonsillectomy; and Inguinal hernia repair (Left, 05/18/2018). SocHx:   reports that he has quit smoking. His smoking use included Cigarettes. He smoked 0.25 packs per day. He has never used smokeless tobacco. He reports that he drinks alcohol. He reports that he does not use drugs. Interval: 8 weeks  Tests/Labs: Echo, renal artery doppler    Scribe Attestation  IJessica, am scribing for and in the presence of Heaven Richmond MD.   SignedJessica 10/24/18 7:19 AM   Provider Selina Vazquez is working as a scribe for and in the presence of justin Richmond MD). Working as a scribe, Jessica Leon may have prepopulated components of this note with my historical  intellectual property under my direct supervision. Any additions to this intellectual property were performed in my presence and at my direction. Furthermore, the content and accuracy of this note have been reviewed by me Heaven Richmond MD). If you have questions, please do not hesitate to call me. I look forward to following Jacob Shah along with you.     Sincerely,        Brigitte Chaudhari MD

## 2018-11-09 ENCOUNTER — TELEPHONE (OUTPATIENT)
Dept: CARDIOLOGY CLINIC | Age: 58
End: 2018-11-09

## 2018-11-09 DIAGNOSIS — I10 UNCONTROLLED HYPERTENSION: Primary | ICD-10-CM

## 2018-11-14 ENCOUNTER — HOSPITAL ENCOUNTER (OUTPATIENT)
Dept: VASCULAR LAB | Age: 58
Discharge: HOME | End: 2018-11-14
Payer: COMMERCIAL

## 2018-11-14 ENCOUNTER — HOSPITAL ENCOUNTER (OUTPATIENT)
Dept: NON INVASIVE DIAGNOSTICS | Age: 58
Discharge: HOME | End: 2018-11-14
Payer: COMMERCIAL

## 2018-11-14 DIAGNOSIS — I10 UNCONTROLLED HYPERTENSION: ICD-10-CM

## 2018-11-14 DIAGNOSIS — I10 ESSENTIAL HYPERTENSION: ICD-10-CM

## 2018-11-14 LAB
LEFT VENTRICULAR EJECTION FRACTION HIGH VALUE: 55 %
LEFT VENTRICULAR EJECTION FRACTION MODE: NORMAL
LV EF: 55 %
LVEF MODALITY: NORMAL

## 2018-11-14 PROCEDURE — 93975 VASCULAR STUDY: CPT

## 2018-11-14 PROCEDURE — 93306 TTE W/DOPPLER COMPLETE: CPT

## 2018-11-15 ENCOUNTER — TELEPHONE (OUTPATIENT)
Dept: CARDIOLOGY CLINIC | Age: 58
End: 2018-11-15

## 2018-12-19 ENCOUNTER — OFFICE VISIT (OUTPATIENT)
Dept: CARDIOLOGY CLINIC | Age: 58
End: 2018-12-19
Payer: COMMERCIAL

## 2018-12-19 VITALS
BODY MASS INDEX: 25.62 KG/M2 | HEIGHT: 77 IN | SYSTOLIC BLOOD PRESSURE: 168 MMHG | WEIGHT: 217 LBS | HEART RATE: 68 BPM | DIASTOLIC BLOOD PRESSURE: 92 MMHG | RESPIRATION RATE: 16 BRPM

## 2018-12-19 DIAGNOSIS — I10 ESSENTIAL HYPERTENSION: Primary | ICD-10-CM

## 2018-12-19 DIAGNOSIS — E78.00 HYPERCHOLESTEREMIA: ICD-10-CM

## 2018-12-19 DIAGNOSIS — I10 RESISTANT HYPERTENSION: ICD-10-CM

## 2018-12-19 PROCEDURE — 99213 OFFICE O/P EST LOW 20 MIN: CPT | Performed by: INTERNAL MEDICINE

## 2018-12-19 RX ORDER — HYDRALAZINE HYDROCHLORIDE 100 MG/1
100 TABLET, FILM COATED ORAL 3 TIMES DAILY
Qty: 270 TABLET | Refills: 3 | Status: SHIPPED | OUTPATIENT
Start: 2018-12-19 | End: 2019-12-24

## 2018-12-19 RX ORDER — AMLODIPINE BESYLATE 5 MG/1
5 TABLET ORAL DAILY
Qty: 90 TABLET | Refills: 3 | Status: SHIPPED | OUTPATIENT
Start: 2018-12-19 | End: 2019-03-06

## 2018-12-19 NOTE — LETTER
Mónica Izquierdo is working as a scribe for and in the presence of me Kiet Oden MD). Working as a scribe, Mónica Izquierdo may have prepopulated components of this note with my historical  intellectual property under my direct supervision. Any additions to this intellectual property were performed in my presence and at my direction. Furthermore, the content and accuracy of this note have been reviewed by me Kiet Oden MD). If you have questions, please do not hesitate to call me. I look forward to following Tracee Lim along with you.     Sincerely,        Meme Dumont MD

## 2019-01-17 ENCOUNTER — TELEPHONE (OUTPATIENT)
Dept: CARDIOLOGY CLINIC | Age: 59
End: 2019-01-17

## 2019-01-18 RX ORDER — IRBESARTAN 300 MG/1
300 TABLET ORAL DAILY
Qty: 30 TABLET | Refills: 6 | Status: SHIPPED | OUTPATIENT
Start: 2019-01-18 | End: 2019-03-06

## 2019-01-30 PROBLEM — M54.42 CHRONIC BILATERAL LOW BACK PAIN WITH BILATERAL SCIATICA: Status: ACTIVE | Noted: 2019-01-30

## 2019-01-30 PROBLEM — M54.41 CHRONIC BILATERAL LOW BACK PAIN WITH BILATERAL SCIATICA: Status: ACTIVE | Noted: 2019-01-30

## 2019-01-30 PROBLEM — G89.29 CHRONIC BILATERAL LOW BACK PAIN WITH BILATERAL SCIATICA: Status: ACTIVE | Noted: 2019-01-30

## 2019-02-14 ENCOUNTER — OFFICE VISIT (OUTPATIENT)
Dept: FAMILY MEDICINE CLINIC | Age: 59
End: 2019-02-14
Payer: COMMERCIAL

## 2019-02-14 VITALS
DIASTOLIC BLOOD PRESSURE: 92 MMHG | HEIGHT: 77 IN | WEIGHT: 220.2 LBS | BODY MASS INDEX: 26 KG/M2 | SYSTOLIC BLOOD PRESSURE: 158 MMHG

## 2019-02-14 DIAGNOSIS — C34.92 CARCINOMA OF LEFT LUNG (HCC): ICD-10-CM

## 2019-02-14 DIAGNOSIS — I10 ESSENTIAL HYPERTENSION: Primary | ICD-10-CM

## 2019-02-14 DIAGNOSIS — G89.29 CHRONIC BILATERAL LOW BACK PAIN WITH BILATERAL SCIATICA: ICD-10-CM

## 2019-02-14 DIAGNOSIS — M54.42 CHRONIC BILATERAL LOW BACK PAIN WITH BILATERAL SCIATICA: ICD-10-CM

## 2019-02-14 DIAGNOSIS — J43.1 PANLOBULAR EMPHYSEMA (HCC): ICD-10-CM

## 2019-02-14 DIAGNOSIS — M54.41 CHRONIC BILATERAL LOW BACK PAIN WITH BILATERAL SCIATICA: ICD-10-CM

## 2019-02-14 DIAGNOSIS — E78.00 HYPERCHOLESTEREMIA: ICD-10-CM

## 2019-02-14 DIAGNOSIS — Z29.9 PREVENTIVE MEASURE: ICD-10-CM

## 2019-02-14 LAB
A/G RATIO: 1.6 (ref 1.1–2.2)
ALBUMIN SERPL-MCNC: 4.2 G/DL (ref 3.4–5)
ALP BLD-CCNC: 43 U/L (ref 40–129)
ALT SERPL-CCNC: 17 U/L (ref 10–40)
ANION GAP SERPL CALCULATED.3IONS-SCNC: 13 MMOL/L (ref 3–16)
AST SERPL-CCNC: 20 U/L (ref 15–37)
BILIRUB SERPL-MCNC: 0.6 MG/DL (ref 0–1)
BILIRUBIN, POC: ABNORMAL
BLOOD URINE, POC: ABNORMAL
BUN BLDV-MCNC: 9 MG/DL (ref 7–20)
C-REACTIVE PROTEIN: 4.6 MG/L (ref 0–5.1)
CALCIUM SERPL-MCNC: 9.2 MG/DL (ref 8.3–10.6)
CHLORIDE BLD-SCNC: 98 MMOL/L (ref 99–110)
CHOLESTEROL, TOTAL: 219 MG/DL (ref 0–199)
CLARITY, POC: ABNORMAL
CO2: 24 MMOL/L (ref 21–32)
COLOR, POC: YELLOW
CREAT SERPL-MCNC: 0.9 MG/DL (ref 0.9–1.3)
GFR AFRICAN AMERICAN: >60
GFR NON-AFRICAN AMERICAN: >60
GLOBULIN: 2.7 G/DL
GLUCOSE BLD-MCNC: 105 MG/DL (ref 70–99)
GLUCOSE URINE, POC: ABNORMAL
HCT VFR BLD CALC: 49.9 % (ref 40.5–52.5)
HDLC SERPL-MCNC: 40 MG/DL (ref 40–60)
HEMOGLOBIN: 17.2 G/DL (ref 13.5–17.5)
KETONES, POC: ABNORMAL
LDL CHOLESTEROL CALCULATED: 132 MG/DL
LEUKOCYTE EST, POC: ABNORMAL
MCH RBC QN AUTO: 36.1 PG (ref 26–34)
MCHC RBC AUTO-ENTMCNC: 34.5 G/DL (ref 31–36)
MCV RBC AUTO: 104.8 FL (ref 80–100)
NITRITE, POC: ABNORMAL
PDW BLD-RTO: 13.8 % (ref 12.4–15.4)
PH, POC: 5.5
PLATELET # BLD: 225 K/UL (ref 135–450)
PMV BLD AUTO: 8.2 FL (ref 5–10.5)
POTASSIUM SERPL-SCNC: 4 MMOL/L (ref 3.5–5.1)
PROSTATE SPECIFIC ANTIGEN: 3.84 NG/ML (ref 0–4)
PROTEIN, POC: 30
RBC # BLD: 4.77 M/UL (ref 4.2–5.9)
SODIUM BLD-SCNC: 135 MMOL/L (ref 136–145)
SPECIFIC GRAVITY, POC: 1.02
TOTAL PROTEIN: 6.9 G/DL (ref 6.4–8.2)
TRIGL SERPL-MCNC: 237 MG/DL (ref 0–150)
TSH REFLEX: 3.32 UIU/ML (ref 0.27–4.2)
UROBILINOGEN, POC: 0.2
VLDLC SERPL CALC-MCNC: 47 MG/DL
WBC # BLD: 6.3 K/UL (ref 4–11)

## 2019-02-14 PROCEDURE — 99214 OFFICE O/P EST MOD 30 MIN: CPT | Performed by: FAMILY MEDICINE

## 2019-02-14 PROCEDURE — 81002 URINALYSIS NONAUTO W/O SCOPE: CPT | Performed by: FAMILY MEDICINE

## 2019-02-14 RX ORDER — BUDESONIDE AND FORMOTEROL FUMARATE DIHYDRATE 160; 4.5 UG/1; UG/1
2 AEROSOL RESPIRATORY (INHALATION) 2 TIMES DAILY
Qty: 1 INHALER | Refills: 5 | Status: SHIPPED | OUTPATIENT
Start: 2019-02-14 | End: 2019-06-07 | Stop reason: ALTCHOICE

## 2019-02-14 ASSESSMENT — PATIENT HEALTH QUESTIONNAIRE - PHQ9
SUM OF ALL RESPONSES TO PHQ QUESTIONS 1-9: 0
2. FEELING DOWN, DEPRESSED OR HOPELESS: 0
1. LITTLE INTEREST OR PLEASURE IN DOING THINGS: 0
SUM OF ALL RESPONSES TO PHQ9 QUESTIONS 1 & 2: 0
SUM OF ALL RESPONSES TO PHQ QUESTIONS 1-9: 0

## 2019-02-14 ASSESSMENT — ENCOUNTER SYMPTOMS
ABDOMINAL PAIN: 0
WHEEZING: 0
SHORTNESS OF BREATH: 0
DIARRHEA: 0
CONSTIPATION: 0
TROUBLE SWALLOWING: 0
SORE THROAT: 0
BLOOD IN STOOL: 0
CHEST TIGHTNESS: 0

## 2019-02-15 LAB — SEDIMENTATION RATE, ERYTHROCYTE: 8 MM/HR (ref 0–20)

## 2019-03-06 ENCOUNTER — OFFICE VISIT (OUTPATIENT)
Dept: CARDIOLOGY CLINIC | Age: 59
End: 2019-03-06
Payer: COMMERCIAL

## 2019-03-06 VITALS
DIASTOLIC BLOOD PRESSURE: 88 MMHG | HEART RATE: 58 BPM | BODY MASS INDEX: 26.05 KG/M2 | HEIGHT: 77 IN | SYSTOLIC BLOOD PRESSURE: 162 MMHG | WEIGHT: 220.6 LBS | OXYGEN SATURATION: 96 %

## 2019-03-06 DIAGNOSIS — E78.00 HYPERCHOLESTEREMIA: ICD-10-CM

## 2019-03-06 DIAGNOSIS — I10 ESSENTIAL HYPERTENSION: Primary | ICD-10-CM

## 2019-03-06 PROCEDURE — 99213 OFFICE O/P EST LOW 20 MIN: CPT | Performed by: INTERNAL MEDICINE

## 2019-03-06 RX ORDER — AMLODIPINE BESYLATE 5 MG/1
5 TABLET ORAL DAILY
Qty: 90 TABLET | Refills: 3 | Status: SHIPPED | OUTPATIENT
Start: 2019-03-06 | End: 2019-06-07

## 2019-06-03 DIAGNOSIS — I10 RESISTANT HYPERTENSION: ICD-10-CM

## 2019-06-03 RX ORDER — METOPROLOL TARTRATE 50 MG/1
TABLET, FILM COATED ORAL
Qty: 60 TABLET | Refills: 0 | Status: SHIPPED | OUTPATIENT
Start: 2019-06-03 | End: 2019-07-01 | Stop reason: SDUPTHER

## 2019-06-07 ENCOUNTER — OFFICE VISIT (OUTPATIENT)
Dept: CARDIOLOGY CLINIC | Age: 59
End: 2019-06-07
Payer: COMMERCIAL

## 2019-06-07 VITALS
BODY MASS INDEX: 25.98 KG/M2 | WEIGHT: 220 LBS | SYSTOLIC BLOOD PRESSURE: 130 MMHG | HEART RATE: 56 BPM | DIASTOLIC BLOOD PRESSURE: 80 MMHG | HEIGHT: 77 IN

## 2019-06-07 DIAGNOSIS — E78.00 HYPERCHOLESTEREMIA: ICD-10-CM

## 2019-06-07 DIAGNOSIS — I10 ESSENTIAL HYPERTENSION: Primary | ICD-10-CM

## 2019-06-07 PROCEDURE — 99213 OFFICE O/P EST LOW 20 MIN: CPT | Performed by: INTERNAL MEDICINE

## 2019-06-07 NOTE — PROGRESS NOTES
[address]     H+P // CONSULT // OUTPATIENT VISIT // FOLLOWUP VISIT     Referring Doctor [address] [facility], DO   Encounter Type Followup     CHIEF COMPLAINT     Visit Type Chronic   Symptoms None   Problems HTN, CHOL     HISTORY OF PRESENT ILLNESS      GEN - Doing well. BP in much better range. Tolerating current regimen.  HTN - Ambulatory BP readings in good range. No HA or dizziness.  CHOL - Last cholesterol reviewed and mildly elevated. Has changed diet and increased exercise. Prefers dietary modification to drug therapy.  MED - Compliant with CV meds listed below without notable side effects     COMPLIANCE     Category Meds Diet Salt Exercise Tobacco Alcohol Drugs   Compliant [x] [x] [x] [x] [x] [x] [x]   [x]Counseling given on all above above categories    HISTORY/ALLERGIES/ROS     MedHx:  has a past medical history of Adenocarcinoma, lung ([facility] [address] Anxiety, COPD (chronic obstructive pulmonary disease) ([facility] [address] Hyperlipidemia, Hypertension, and Wears dentures. SurgHx:  has a past surgical history that includes Colonoscopy (2010); Upper gastrointestinal endoscopy (2010); Lung cancer surgery (2018); Tonsillectomy; and Inguinal hernia repair (Left, 05/18/2018). SocHx:   reports that he has quit smoking. His smoking use included cigarettes. He smoked 0.25 packs per day. He has never used smokeless tobacco. He reports that he drinks alcohol. He reports that he does not use drugs. FamHx: family history includes Cancer in his mother; Emphysema in his father. Allergies: Patient has no known allergies.    ROS:  [x]Full ROS obtained and negative except as mentioned in HPI     MEDICATIONS     Current Outpatient Medications   Medication Sig Dispense Refill    metoprolol tartrate (LOPRESSOR) 50 MG tablet TAKE 1 TABLET BY MOUTH TWICE DAILY 60 tablet 0    amLODIPine (NORVASC) 5 MG tablet Take 1 tablet by mouth daily 90 tablet 3    budesonide-formoterol (SYMBICORT) 160-4.5 MCG/ACT AERO Inhale 2 puffs into the lungs 2 times daily 1 Inhaler 5    hydrALAZINE (APRESOLINE) 100 MG tablet Take 1 tablet by mouth 3 times daily 270 tablet 3     No current facility-administered medications for this visit.       Reviewed with patient and will remain unchanged except as mentioned in A/P  PHYSICAL EXAM     Vitals:    06/07/19 0849   BP: 130/80   Pulse: 56      Gen Alert, coop, no distress Heart  RRR, no MRG   Head NC, AT, no abnorm Abd  Soft, NT, +BS, no mass, no OM   Eyes PER, conj/corn clear Ext  Ext nl, AT, no C/C/E   Nose Nares nl, no drain, NT Pulse 2+ and symmetric   Throat Lips, mucosa, tongue nl Skin Col/text/turg nl, no vis rash/les   Neck S/S, TM, NT, no bruit/JVD Psych Nl mood and affect   Lung CTA-B, unlabored, no DTP Lymph   No cervical or axillary LA   Ch wall NT, no deform Neuro  Nl gross M/S exam     ASSESSMENT AND PLAN     ~HTN  Today BP Controlled   Counseling Counseled on diet/salt, exercise and ideal body weight   Renal Dopp Normal   TTE 11/18: 55%, mild to mod LVH, mild root enlargement   Plan Continue current meds at doses above   ~Hyperchol  Goal LDL <100   Counseling Counseled on diet, exercise and ideal body weight   Plan PCP liver/lipid surveillance, continue current meds at doses above   ~Followup  Interval: PRN  Tests/Labs: None

## 2019-06-07 NOTE — PATIENT INSTRUCTIONS
2/14/19 - LDL was 132  1. Eat plenty of fruits, vegetables, low fat, avoid fried food  2. Start exercising regularly which will help reduce bad cholesterol (LDL)   3.  Labs per Dr. Fide Fine  4. See Dr. Zamora All as needed - call office for any concerning cardiac symptoms.

## 2019-07-01 DIAGNOSIS — I10 RESISTANT HYPERTENSION: ICD-10-CM

## 2019-07-01 RX ORDER — METOPROLOL TARTRATE 50 MG/1
TABLET, FILM COATED ORAL
Qty: 60 TABLET | Refills: 0 | Status: SHIPPED | OUTPATIENT
Start: 2019-07-01 | End: 2019-09-24 | Stop reason: SDUPTHER

## 2019-09-24 DIAGNOSIS — I10 RESISTANT HYPERTENSION: ICD-10-CM

## 2019-09-24 RX ORDER — METOPROLOL TARTRATE 50 MG/1
TABLET, FILM COATED ORAL
Qty: 60 TABLET | Refills: 0 | Status: SHIPPED | OUTPATIENT
Start: 2019-09-24 | End: 2019-11-11 | Stop reason: SDUPTHER

## 2019-11-11 DIAGNOSIS — I10 RESISTANT HYPERTENSION: ICD-10-CM

## 2019-11-11 RX ORDER — METOPROLOL TARTRATE 50 MG/1
TABLET, FILM COATED ORAL
Qty: 60 TABLET | Refills: 0 | Status: SHIPPED | OUTPATIENT
Start: 2019-11-11 | End: 2020-01-13

## 2019-12-05 ENCOUNTER — OFFICE VISIT (OUTPATIENT)
Dept: FAMILY MEDICINE CLINIC | Age: 59
End: 2019-12-05
Payer: COMMERCIAL

## 2019-12-05 VITALS
DIASTOLIC BLOOD PRESSURE: 92 MMHG | WEIGHT: 232.8 LBS | SYSTOLIC BLOOD PRESSURE: 158 MMHG | BODY MASS INDEX: 27.49 KG/M2 | HEIGHT: 77 IN

## 2019-12-05 DIAGNOSIS — M54.2 CERVICAL PAIN: ICD-10-CM

## 2019-12-05 DIAGNOSIS — M54.6 ACUTE LEFT-SIDED THORACIC BACK PAIN: Primary | ICD-10-CM

## 2019-12-05 DIAGNOSIS — I10 ESSENTIAL HYPERTENSION: ICD-10-CM

## 2019-12-05 PROCEDURE — 99213 OFFICE O/P EST LOW 20 MIN: CPT | Performed by: FAMILY MEDICINE

## 2019-12-05 RX ORDER — CYCLOBENZAPRINE HCL 10 MG
10 TABLET ORAL NIGHTLY PRN
Qty: 30 TABLET | Refills: 0 | Status: SHIPPED | OUTPATIENT
Start: 2019-12-05 | End: 2019-12-15

## 2019-12-05 ASSESSMENT — ENCOUNTER SYMPTOMS
NAUSEA: 0
SHORTNESS OF BREATH: 0
DIARRHEA: 0
VOMITING: 0
TROUBLE SWALLOWING: 0
ABDOMINAL PAIN: 0
SORE THROAT: 0
RHINORRHEA: 0
SINUS PRESSURE: 0

## 2019-12-07 ASSESSMENT — ENCOUNTER SYMPTOMS
COLOR CHANGE: 0
BACK PAIN: 1

## 2019-12-20 DIAGNOSIS — I10 RESISTANT HYPERTENSION: ICD-10-CM

## 2019-12-24 RX ORDER — HYDRALAZINE HYDROCHLORIDE 100 MG/1
TABLET, FILM COATED ORAL
Qty: 270 TABLET | Refills: 3 | Status: SHIPPED | OUTPATIENT
Start: 2019-12-24

## 2019-12-27 ENCOUNTER — APPOINTMENT (OUTPATIENT)
Dept: CT IMAGING | Age: 59
End: 2019-12-27
Payer: COMMERCIAL

## 2019-12-27 ENCOUNTER — HOSPITAL ENCOUNTER (EMERGENCY)
Age: 59
Discharge: HOME | End: 2019-12-27
Attending: EMERGENCY MEDICINE
Payer: COMMERCIAL

## 2019-12-27 VITALS
SYSTOLIC BLOOD PRESSURE: 164 MMHG | TEMPERATURE: 97.9 F | BODY MASS INDEX: 27.39 KG/M2 | RESPIRATION RATE: 18 BRPM | HEART RATE: 55 BPM | WEIGHT: 232 LBS | OXYGEN SATURATION: 97 % | DIASTOLIC BLOOD PRESSURE: 108 MMHG | HEIGHT: 77 IN

## 2019-12-27 DIAGNOSIS — R10.9 FLANK PAIN: ICD-10-CM

## 2019-12-27 DIAGNOSIS — J40 BRONCHITIS: Primary | ICD-10-CM

## 2019-12-27 LAB
A/G RATIO: 1.3 (ref 1.1–2.2)
ALBUMIN SERPL-MCNC: 4.5 G/DL (ref 3.4–5)
ALP BLD-CCNC: 45 U/L (ref 40–129)
ALT SERPL-CCNC: 38 U/L (ref 10–40)
ANION GAP SERPL CALCULATED.3IONS-SCNC: 14 MMOL/L (ref 3–16)
AST SERPL-CCNC: 38 U/L (ref 15–37)
BASOPHILS ABSOLUTE: 0 K/UL (ref 0–0.2)
BASOPHILS RELATIVE PERCENT: 0.5 %
BILIRUB SERPL-MCNC: 0.9 MG/DL (ref 0–1)
BILIRUBIN URINE: NEGATIVE
BLOOD, URINE: ABNORMAL
BUN BLDV-MCNC: 8 MG/DL (ref 7–20)
CALCIUM SERPL-MCNC: 9.6 MG/DL (ref 8.3–10.6)
CHLORIDE BLD-SCNC: 97 MMOL/L (ref 99–110)
CLARITY: CLEAR
CO2: 24 MMOL/L (ref 21–32)
COLOR: YELLOW
CREAT SERPL-MCNC: 0.9 MG/DL (ref 0.8–1.3)
EOSINOPHILS ABSOLUTE: 0 K/UL (ref 0–0.6)
EOSINOPHILS RELATIVE PERCENT: 0.4 %
EPITHELIAL CELLS, UA: 0 /HPF (ref 0–5)
GFR AFRICAN AMERICAN: >60
GFR NON-AFRICAN AMERICAN: >60
GLOBULIN: 3.5 G/DL
GLUCOSE BLD-MCNC: 90 MG/DL (ref 70–99)
GLUCOSE URINE: NEGATIVE MG/DL
HCT VFR BLD CALC: 51.7 % (ref 40.5–52.5)
HEMOGLOBIN: 18.1 G/DL (ref 13.5–17.5)
HYALINE CASTS: 0 /LPF (ref 0–8)
KETONES, URINE: NEGATIVE MG/DL
LEUKOCYTE ESTERASE, URINE: ABNORMAL
LYMPHOCYTES ABSOLUTE: 1.4 K/UL (ref 1–5.1)
LYMPHOCYTES RELATIVE PERCENT: 17 %
MCH RBC QN AUTO: 34.9 PG (ref 26–34)
MCHC RBC AUTO-ENTMCNC: 35 G/DL (ref 31–36)
MCV RBC AUTO: 99.8 FL (ref 80–100)
MICROSCOPIC EXAMINATION: YES
MONOCYTES ABSOLUTE: 0.7 K/UL (ref 0–1.3)
MONOCYTES RELATIVE PERCENT: 8.9 %
NEUTROPHILS ABSOLUTE: 6 K/UL (ref 1.7–7.7)
NEUTROPHILS RELATIVE PERCENT: 73.2 %
NITRITE, URINE: NEGATIVE
PDW BLD-RTO: 13.3 % (ref 12.4–15.4)
PH UA: 6 (ref 5–8)
PLATELET # BLD: 239 K/UL (ref 135–450)
PMV BLD AUTO: 8.3 FL (ref 5–10.5)
POTASSIUM SERPL-SCNC: 4.7 MMOL/L (ref 3.5–5.1)
PROTEIN UA: NEGATIVE MG/DL
RBC # BLD: 5.19 M/UL (ref 4.2–5.9)
RBC UA: 1 /HPF (ref 0–4)
SODIUM BLD-SCNC: 135 MMOL/L (ref 136–145)
SPECIFIC GRAVITY UA: 1.02 (ref 1–1.03)
TOTAL PROTEIN: 8 G/DL (ref 6.4–8.2)
URINE REFLEX TO CULTURE: YES
URINE TYPE: ABNORMAL
UROBILINOGEN, URINE: 0.2 E.U./DL
WBC # BLD: 8.2 K/UL (ref 4–11)
WBC UA: 1 /HPF (ref 0–5)

## 2019-12-27 PROCEDURE — 6360000004 HC RX CONTRAST MEDICATION: Performed by: NURSE PRACTITIONER

## 2019-12-27 PROCEDURE — 85025 COMPLETE CBC W/AUTO DIFF WBC: CPT

## 2019-12-27 PROCEDURE — 87086 URINE CULTURE/COLONY COUNT: CPT

## 2019-12-27 PROCEDURE — 99284 EMERGENCY DEPT VISIT MOD MDM: CPT

## 2019-12-27 PROCEDURE — 71260 CT THORAX DX C+: CPT

## 2019-12-27 PROCEDURE — 80053 COMPREHEN METABOLIC PANEL: CPT

## 2019-12-27 PROCEDURE — 81001 URINALYSIS AUTO W/SCOPE: CPT

## 2019-12-27 RX ORDER — PREDNISONE 10 MG/1
60 TABLET ORAL DAILY
Qty: 30 TABLET | Refills: 0 | Status: SHIPPED | OUTPATIENT
Start: 2019-12-27 | End: 2020-01-01

## 2019-12-27 RX ADMIN — IOPAMIDOL 75 ML: 755 INJECTION, SOLUTION INTRAVENOUS at 14:31

## 2019-12-27 ASSESSMENT — ENCOUNTER SYMPTOMS
CHEST TIGHTNESS: 0
NAUSEA: 0
BACK PAIN: 1
DIARRHEA: 0
VOMITING: 0
SHORTNESS OF BREATH: 0
ABDOMINAL PAIN: 0

## 2019-12-27 ASSESSMENT — PAIN DESCRIPTION - LOCATION: LOCATION: ABDOMEN;BACK

## 2019-12-27 ASSESSMENT — PAIN SCALES - GENERAL: PAINLEVEL_OUTOF10: 7

## 2019-12-27 ASSESSMENT — PAIN DESCRIPTION - PAIN TYPE: TYPE: ACUTE PAIN

## 2019-12-27 ASSESSMENT — PAIN DESCRIPTION - ORIENTATION: ORIENTATION: LEFT;LOWER;POSTERIOR

## 2019-12-27 ASSESSMENT — PAIN DESCRIPTION - DESCRIPTORS: DESCRIPTORS: ACHING;CONSTANT

## 2019-12-28 LAB — URINE CULTURE, ROUTINE: NORMAL

## 2021-03-09 ENCOUNTER — IMMUNIZATION (OUTPATIENT)
Dept: PRIMARY CARE CLINIC | Age: 61
End: 2021-03-09
Payer: COMMERCIAL

## 2021-03-09 PROCEDURE — 91301 COVID-19, MODERNA VACCINE 100MCG/0.5ML DOSE: CPT | Performed by: FAMILY MEDICINE

## 2021-03-09 PROCEDURE — 0011A COVID-19, MODERNA VACCINE 100MCG/0.5ML DOSE: CPT | Performed by: FAMILY MEDICINE

## 2021-04-06 ENCOUNTER — IMMUNIZATION (OUTPATIENT)
Dept: PRIMARY CARE CLINIC | Age: 61
End: 2021-04-06
Payer: COMMERCIAL

## 2021-04-06 PROCEDURE — 0012A COVID-19, MODERNA VACCINE 100MCG/0.5ML DOSE: CPT

## 2021-04-06 PROCEDURE — 91301 COVID-19, MODERNA VACCINE 100MCG/0.5ML DOSE: CPT

## 2024-02-08 ENCOUNTER — TELEPHONE (OUTPATIENT)
Dept: FAMILY MEDICINE CLINIC | Age: 64
End: 2024-02-08

## 2024-02-08 NOTE — TELEPHONE ENCOUNTER
----- Message from Sherley Rodriguez sent at 2/7/2024 12:55 PM EST -----  Subject: Appointment Request    Reason for Call: New Patient/New to Provider Appointment needed: New   Patient Request Appointment    QUESTIONS    Reason for appointment request? Available appointments did not meet   patient need     Additional Information for Provider? Patient would like to re-establish   care with Dr. Narvaez ([place]) please call to schedule or advise  ---------------------------------------------------------------------------  --------------  CALL BACK INFO  [phone]; OK to leave message on voicemail  ---------------------------------------------------------------------------  --------------  SCRIPT ANSWERS

## 2024-02-08 NOTE — TELEPHONE ENCOUNTER
Patient was blind transferred to our office from [facility]. Advised patient that our providers are not currently accepting new patients. Patient states he was previous Dr Nolen patient but did not transfer care to another provider @ [facility] when Dr Nolen retired, that he went to . Patient is no longer happy with  and requesting to establish care with Dr Leyva. Filled out new patient form and gave to RAYMUNDO Espitia.

## 2025-08-11 ENCOUNTER — OFFICE VISIT (OUTPATIENT)
Age: 65
End: 2025-08-11
Payer: MEDICARE

## 2025-08-11 DIAGNOSIS — L24.9 IRRITANT DERMATITIS: Primary | ICD-10-CM

## 2025-08-11 DIAGNOSIS — L91.8 CUTANEOUS SKIN TAGS: ICD-10-CM

## 2025-08-11 PROCEDURE — 99202 OFFICE O/P NEW SF 15 MIN: CPT | Performed by: DERMATOLOGY

## 2025-08-11 PROCEDURE — 1123F ACP DISCUSS/DSCN MKR DOCD: CPT | Performed by: DERMATOLOGY

## 2025-08-11 PROCEDURE — 1160F RVW MEDS BY RX/DR IN RCRD: CPT | Performed by: DERMATOLOGY

## 2025-08-11 PROCEDURE — 11200 RMVL SKIN TAGS UP TO&INC 15: CPT | Performed by: DERMATOLOGY

## 2025-08-11 PROCEDURE — 1159F MED LIST DOCD IN RCRD: CPT | Performed by: DERMATOLOGY

## 2025-08-11 RX ORDER — HYDROCORTISONE 25 MG/G
OINTMENT TOPICAL
Qty: 20 G | Refills: 1 | Status: SHIPPED | OUTPATIENT
Start: 2025-08-11 | End: 2025-08-18